# Patient Record
Sex: MALE | Race: WHITE | NOT HISPANIC OR LATINO | Employment: FULL TIME | URBAN - NONMETROPOLITAN AREA
[De-identification: names, ages, dates, MRNs, and addresses within clinical notes are randomized per-mention and may not be internally consistent; named-entity substitution may affect disease eponyms.]

---

## 2023-12-28 ENCOUNTER — OFFICE VISIT (OUTPATIENT)
Dept: PRIMARY CARE | Facility: CLINIC | Age: 28
End: 2023-12-28
Payer: COMMERCIAL

## 2023-12-28 VITALS
SYSTOLIC BLOOD PRESSURE: 124 MMHG | DIASTOLIC BLOOD PRESSURE: 82 MMHG | WEIGHT: 239 LBS | BODY MASS INDEX: 34.22 KG/M2 | OXYGEN SATURATION: 98 % | HEART RATE: 102 BPM | HEIGHT: 70 IN

## 2023-12-28 DIAGNOSIS — R53.83 OTHER FATIGUE: ICD-10-CM

## 2023-12-28 DIAGNOSIS — K21.9 GASTROESOPHAGEAL REFLUX DISEASE WITHOUT ESOPHAGITIS: ICD-10-CM

## 2023-12-28 DIAGNOSIS — B35.3 TINEA PEDIS OF BOTH FEET: ICD-10-CM

## 2023-12-28 DIAGNOSIS — Z13.220 LIPID SCREENING: ICD-10-CM

## 2023-12-28 DIAGNOSIS — R05.3 CHRONIC COUGH: Primary | ICD-10-CM

## 2023-12-28 DIAGNOSIS — R09.82 PND (POST-NASAL DRIP): ICD-10-CM

## 2023-12-28 PROBLEM — E66.9 OBESITY: Status: ACTIVE | Noted: 2019-03-19

## 2023-12-28 PROBLEM — J30.9 ALLERGIC RHINITIS: Status: ACTIVE | Noted: 2019-03-19

## 2023-12-28 PROCEDURE — 99204 OFFICE O/P NEW MOD 45 MIN: CPT | Performed by: INTERNAL MEDICINE

## 2023-12-28 PROCEDURE — 1036F TOBACCO NON-USER: CPT | Performed by: INTERNAL MEDICINE

## 2023-12-28 RX ORDER — FLUTICASONE PROPIONATE 50 MCG
1 SPRAY, SUSPENSION (ML) NASAL DAILY
Qty: 16 G | Refills: 5 | Status: SHIPPED | OUTPATIENT
Start: 2023-12-28 | End: 2024-12-27

## 2023-12-28 RX ORDER — BUTENAFINE HYDROCHLORIDE 10 MG/G
1 CREAM TOPICAL 2 TIMES DAILY
Qty: 24 G | Refills: 0 | Status: SHIPPED | OUTPATIENT
Start: 2023-12-28 | End: 2024-01-07

## 2023-12-28 ASSESSMENT — PATIENT HEALTH QUESTIONNAIRE - PHQ9
SUM OF ALL RESPONSES TO PHQ9 QUESTIONS 1 AND 2: 0
2. FEELING DOWN, DEPRESSED OR HOPELESS: NOT AT ALL
1. LITTLE INTEREST OR PLEASURE IN DOING THINGS: NOT AT ALL

## 2023-12-28 ASSESSMENT — ENCOUNTER SYMPTOMS
WHEEZING: 0
UNEXPECTED WEIGHT CHANGE: 0
CHEST TIGHTNESS: 0
DIARRHEA: 0
VOMITING: 0
ABDOMINAL PAIN: 0
SHORTNESS OF BREATH: 0
ARTHRALGIAS: 0
PALPITATIONS: 0
BACK PAIN: 0
BLOOD IN STOOL: 0
COUGH: 1
NAUSEA: 0
FATIGUE: 1

## 2023-12-28 NOTE — PATIENT INSTRUCTIONS
As we discussed I sent to informational handouts on acid reflux via [employer].  Please read these carefully and follow the directions.  Please try to refrain from eating or drinking several hours prior to that and propping the head of your bed up at an incline of 30 degrees can also help with reflux symptoms  Please remember to go for fasting lab work at your earliest convenience.  We recommend fasting to be at least 8 hours if not 12 and please remember you can drink as much water as she wants  We also ordered a chest x-ray and please try to go at your earliest convenience  I also sent 2 prescriptions to your pharmacy.  1 is for Flonase nasal spray which is designed to help with your postnasal drip.  Please have the pharmacist show you proper technique and please use this into your follow-up appointment.  If your cough does not resolve with this maneuver then we will discuss other options for treatment  I also sent a prescription for Mentax which is an antifungal cream to be applied to your feet twice a day  Please call if this is not affordable  Please remember to use cotton only socks and changing them twice a day might be of added benefit  We will see you back in follow-up in a couple of weeks

## 2023-12-28 NOTE — PROGRESS NOTES
"Subjective   Patient ID: Karie Conner is a 28 y.o. male who presents for Establish Care (Concerns about increasing BP. Has a cough that has been ongoing for a couple years. Also concerns with a foot fungus. ).  HPI  He is today to get evaluated and has several concerns.  We did review his past medical history and also conducted a full review of systems.  He states that he has been experiencing a cough for \"years \".  He states it is not always every day but has been pretty consistent.  We discussed his symptoms of allergies and he does appear to have classic features of postnasal drip.  He states he has tried over-the-counter Zyrtec but is not always consistent with taking the medication.  I would like to try Flonase nasal spray and after a couple weeks of use we can evaluate his response.  He also has occasional acid reflux and I explained that this also can cause chronic cough.  I will give him a handout that goes over antireflux measures.  We also discussed the possibility of occult asthma and in looking back at his past medical history it does look like it went point in the past he was diagnosed as having asthma.  He does not have classic features at this time but again the cough could be secondary to her cough and asthma.  Had a cough for years and has never had a chest x-ray I am having him go for 1.  We also discussed doing some laboratory testing and he does have a family history of heart disease so we will be checking a lipid profile.  He also has problems with athlete's foot he has been prescribed a topical medication in the past which was helped.  I am sending in Mentax and he will if this does not eradicate the condition.  Review of Systems   Constitutional:  Positive for fatigue. Negative for unexpected weight change.   HENT:  Positive for congestion.    Respiratory:  Positive for cough. Negative for chest tightness, shortness of breath and wheezing.    Cardiovascular:  Negative for chest pain, " palpitations and leg swelling.   Gastrointestinal:  Negative for abdominal pain, blood in stool, diarrhea, nausea and vomiting.   Musculoskeletal:  Negative for arthralgias and back pain.     Objective   Physical Exam  Vitals and nursing note reviewed.   Constitutional:       General: He is not in acute distress.     Appearance: Normal appearance.   HENT:      Head: Normocephalic and atraumatic.   Eyes:      Conjunctiva/sclera: Conjunctivae normal.   Cardiovascular:      Rate and Rhythm: Normal rate and regular rhythm.      Heart sounds: Normal heart sounds.   Pulmonary:      Effort: No respiratory distress.      Breath sounds: No wheezing.   Abdominal:      Palpations: Abdomen is soft.      Tenderness: There is no abdominal tenderness. There is no guarding.   Musculoskeletal:         General: No swelling. Normal range of motion.   Skin:     General: Skin is warm and dry.   Neurological:      General: No focal deficit present.      Mental Status: He is alert and oriented to person, place, and time.   Psychiatric:         Behavior: Behavior normal.       Assessment/Plan   Problem List Items Addressed This Visit             ICD-10-CM    Tinea pedis B35.3     -We will try Mentax and he is instructed to call if it is not affordable  -We talked about using cotton socks only and possibly even changing his socks twice a day         Relevant Medications    butenafine (Mentax) 1 % cream    Chronic cough - Primary R05.3     -He will go for chest x-ray at his earliest convenience  -We are tired getting symptoms of postnasal drip with Flonase nasal spray  -Other considerations include the possibility of acid reflux or occult asthma         Relevant Orders    XR chest 2 views    PND (post-nasal drip) R09.82    Relevant Medications    fluticasone (Flonase) 50 mcg/actuation nasal spray    Gastroesophageal reflux disease without esophagitis K21.9     -I sent several handouts via [employer] on ways to improve his acid reflux symptoms  without medication  -We discussed avoiding eating or drinking 4 hours prior to that and also sleeping on an incline         Lipid screening Z13.220     -He has a family history of heart disease and to his knowledge she has never had a cholesterol profile checked         Relevant Orders    Lipid panel    Other fatigue R53.83    Relevant Orders    CBC and Auto Differential    TSH    Comprehensive Metabolic Panel          Lea Rubio,

## 2023-12-28 NOTE — ASSESSMENT & PLAN NOTE
-We will try Mentax and he is instructed to call if it is not affordable  -We talked about using cotton socks only and possibly even changing his socks twice a day

## 2023-12-28 NOTE — ASSESSMENT & PLAN NOTE
-He has a family history of heart disease and to his knowledge she has never had a cholesterol profile checked

## 2023-12-28 NOTE — ASSESSMENT & PLAN NOTE
-He will go for chest x-ray at his earliest convenience  -We are tired getting symptoms of postnasal drip with Flonase nasal spray  -Other considerations include the possibility of acid reflux or occult asthma

## 2023-12-28 NOTE — ASSESSMENT & PLAN NOTE
-I sent several handouts via [employer] on ways to improve his acid reflux symptoms without medication  -We discussed avoiding eating or drinking 4 hours prior to that and also sleeping on an incline